# Patient Record
Sex: FEMALE | Race: WHITE
[De-identification: names, ages, dates, MRNs, and addresses within clinical notes are randomized per-mention and may not be internally consistent; named-entity substitution may affect disease eponyms.]

---

## 2018-09-15 ENCOUNTER — HOSPITAL ENCOUNTER (EMERGENCY)
Dept: HOSPITAL 15 - ER | Age: 48
Discharge: HOME | End: 2018-09-15
Payer: COMMERCIAL

## 2018-09-15 VITALS — SYSTOLIC BLOOD PRESSURE: 117 MMHG | DIASTOLIC BLOOD PRESSURE: 75 MMHG

## 2018-09-15 VITALS — HEIGHT: 64 IN | BODY MASS INDEX: 32.44 KG/M2 | WEIGHT: 190 LBS

## 2018-09-15 DIAGNOSIS — Z90.89: ICD-10-CM

## 2018-09-15 DIAGNOSIS — Z90.49: ICD-10-CM

## 2018-09-15 DIAGNOSIS — Z90.710: ICD-10-CM

## 2018-09-15 DIAGNOSIS — G43.909: ICD-10-CM

## 2018-09-15 DIAGNOSIS — E78.5: ICD-10-CM

## 2018-09-15 DIAGNOSIS — R07.89: Primary | ICD-10-CM

## 2018-09-15 LAB
ALBUMIN SERPL-MCNC: 3.5 G/DL (ref 3.4–5)
ALP SERPL-CCNC: 64 U/L (ref 45–117)
ALT SERPL-CCNC: 47 U/L (ref 13–56)
ANION GAP SERPL CALCULATED.3IONS-SCNC: 10 MMOL/L (ref 5–15)
BILIRUB SERPL-MCNC: 0.4 MG/DL (ref 0.2–1)
BUN SERPL-MCNC: 9 MG/DL (ref 7–18)
BUN/CREAT SERPL: 9.8
CALCIUM SERPL-MCNC: 8.2 MG/DL (ref 8.5–10.1)
CHLORIDE SERPL-SCNC: 108 MMOL/L (ref 98–107)
CO2 SERPL-SCNC: 21 MMOL/L (ref 21–32)
GLUCOSE SERPL-MCNC: 140 MG/DL (ref 74–106)
HCT VFR BLD AUTO: 42.5 % (ref 36–46)
HGB BLD-MCNC: 14.6 G/DL (ref 12.2–16.2)
MAGNESIUM SERPL-MCNC: 2.3 MG/DL (ref 1.6–2.6)
MCH RBC QN AUTO: 30.4 PG (ref 28–32)
MCV RBC AUTO: 88.7 FL (ref 80–100)
NRBC BLD QL AUTO: 0.2 %
POTASSIUM SERPL-SCNC: 3.9 MMOL/L (ref 3.5–5.1)
PROT SERPL-MCNC: 7.1 G/DL (ref 6.4–8.2)
SODIUM SERPL-SCNC: 139 MMOL/L (ref 136–145)

## 2018-09-15 PROCEDURE — 84484 ASSAY OF TROPONIN QUANT: CPT

## 2018-09-15 PROCEDURE — 70450 CT HEAD/BRAIN W/O DYE: CPT

## 2018-09-15 PROCEDURE — 94761 N-INVAS EAR/PLS OXIMETRY MLT: CPT

## 2018-09-15 PROCEDURE — 71046 X-RAY EXAM CHEST 2 VIEWS: CPT

## 2018-09-15 PROCEDURE — 93005 ELECTROCARDIOGRAM TRACING: CPT

## 2018-09-15 PROCEDURE — 80053 COMPREHEN METABOLIC PANEL: CPT

## 2018-09-15 PROCEDURE — 85025 COMPLETE CBC W/AUTO DIFF WBC: CPT

## 2018-09-15 PROCEDURE — 83735 ASSAY OF MAGNESIUM: CPT

## 2018-09-15 PROCEDURE — 36415 COLL VENOUS BLD VENIPUNCTURE: CPT

## 2020-07-12 ENCOUNTER — HOSPITAL ENCOUNTER (INPATIENT)
Dept: HOSPITAL 15 - ER | Age: 50
LOS: 8 days | Discharge: HOME | DRG: 177 | End: 2020-07-20
Attending: INTERNAL MEDICINE | Admitting: HOSPITALIST
Payer: COMMERCIAL

## 2020-07-12 VITALS — HEIGHT: 64 IN | BODY MASS INDEX: 30.71 KG/M2 | WEIGHT: 179.9 LBS

## 2020-07-12 DIAGNOSIS — N39.0: ICD-10-CM

## 2020-07-12 DIAGNOSIS — J12.89: ICD-10-CM

## 2020-07-12 DIAGNOSIS — U07.1: Primary | ICD-10-CM

## 2020-07-12 DIAGNOSIS — Z90.49: ICD-10-CM

## 2020-07-12 DIAGNOSIS — Z90.710: ICD-10-CM

## 2020-07-12 DIAGNOSIS — B95.2: ICD-10-CM

## 2020-07-12 DIAGNOSIS — M34.9: ICD-10-CM

## 2020-07-12 DIAGNOSIS — J96.00: ICD-10-CM

## 2020-07-12 DIAGNOSIS — E66.01: ICD-10-CM

## 2020-07-12 DIAGNOSIS — J01.00: ICD-10-CM

## 2020-07-12 DIAGNOSIS — I50.9: ICD-10-CM

## 2020-07-12 DIAGNOSIS — J98.11: ICD-10-CM

## 2020-07-12 DIAGNOSIS — E87.6: ICD-10-CM

## 2020-07-12 DIAGNOSIS — E11.65: ICD-10-CM

## 2020-07-12 PROCEDURE — 85610 PROTHROMBIN TIME: CPT

## 2020-07-12 PROCEDURE — 87186 SC STD MICRODIL/AGAR DIL: CPT

## 2020-07-12 PROCEDURE — 83036 HEMOGLOBIN GLYCOSYLATED A1C: CPT

## 2020-07-12 PROCEDURE — 36415 COLL VENOUS BLD VENIPUNCTURE: CPT

## 2020-07-12 PROCEDURE — 71045 X-RAY EXAM CHEST 1 VIEW: CPT

## 2020-07-12 PROCEDURE — 86900 BLOOD TYPING SEROLOGIC ABO: CPT

## 2020-07-12 PROCEDURE — 83880 ASSAY OF NATRIURETIC PEPTIDE: CPT

## 2020-07-12 PROCEDURE — 82728 ASSAY OF FERRITIN: CPT

## 2020-07-12 PROCEDURE — 80061 LIPID PANEL: CPT

## 2020-07-12 PROCEDURE — 86141 C-REACTIVE PROTEIN HS: CPT

## 2020-07-12 PROCEDURE — 85730 THROMBOPLASTIN TIME PARTIAL: CPT

## 2020-07-12 PROCEDURE — 82962 GLUCOSE BLOOD TEST: CPT

## 2020-07-12 PROCEDURE — 86850 RBC ANTIBODY SCREEN: CPT

## 2020-07-12 PROCEDURE — 85025 COMPLETE CBC W/AUTO DIFF WBC: CPT

## 2020-07-12 PROCEDURE — 87086 URINE CULTURE/COLONY COUNT: CPT

## 2020-07-12 PROCEDURE — 80053 COMPREHEN METABOLIC PANEL: CPT

## 2020-07-12 PROCEDURE — 87070 CULTURE OTHR SPECIMN AEROBIC: CPT

## 2020-07-12 PROCEDURE — 86901 BLOOD TYPING SEROLOGIC RH(D): CPT

## 2020-07-12 PROCEDURE — 81001 URINALYSIS AUTO W/SCOPE: CPT

## 2020-07-12 PROCEDURE — 84702 CHORIONIC GONADOTROPIN TEST: CPT

## 2020-07-12 PROCEDURE — 87880 STREP A ASSAY W/OPTIC: CPT

## 2020-07-12 PROCEDURE — 87804 INFLUENZA ASSAY W/OPTIC: CPT

## 2020-07-12 PROCEDURE — 84443 ASSAY THYROID STIM HORMONE: CPT

## 2020-07-12 PROCEDURE — 80048 BASIC METABOLIC PNL TOTAL CA: CPT

## 2020-07-12 PROCEDURE — 83735 ASSAY OF MAGNESIUM: CPT

## 2020-07-12 PROCEDURE — 83615 LACTATE (LD) (LDH) ENZYME: CPT

## 2020-07-12 PROCEDURE — 87088 URINE BACTERIA CULTURE: CPT

## 2020-07-12 PROCEDURE — 84484 ASSAY OF TROPONIN QUANT: CPT

## 2020-07-12 PROCEDURE — 93005 ELECTROCARDIOGRAM TRACING: CPT

## 2020-07-12 PROCEDURE — 84132 ASSAY OF SERUM POTASSIUM: CPT

## 2020-07-12 PROCEDURE — 87040 BLOOD CULTURE FOR BACTERIA: CPT

## 2020-07-12 PROCEDURE — 85379 FIBRIN DEGRADATION QUANT: CPT

## 2020-07-12 PROCEDURE — 83605 ASSAY OF LACTIC ACID: CPT

## 2020-07-13 LAB
ALBUMIN SERPL-MCNC: 3.8 G/DL (ref 3.4–5)
ALP SERPL-CCNC: 75 U/L (ref 45–117)
ALT SERPL-CCNC: 97 U/L (ref 13–56)
ANION GAP SERPL CALCULATED.3IONS-SCNC: 6 MMOL/L (ref 5–15)
ANION GAP SERPL CALCULATED.3IONS-SCNC: 7 MMOL/L (ref 5–15)
APTT PPP: 26.1 SEC (ref 23.64–32.05)
BILIRUB SERPL-MCNC: 0.9 MG/DL (ref 0.2–1)
BUN SERPL-MCNC: 11 MG/DL (ref 7–18)
BUN SERPL-MCNC: 12 MG/DL (ref 7–18)
BUN/CREAT SERPL: 11.1
BUN/CREAT SERPL: 15
CALCIUM SERPL-MCNC: 8.7 MG/DL (ref 8.5–10.1)
CALCIUM SERPL-MCNC: 8.9 MG/DL (ref 8.5–10.1)
CHLORIDE SERPL-SCNC: 102 MMOL/L (ref 98–107)
CHLORIDE SERPL-SCNC: 102 MMOL/L (ref 98–107)
CO2 SERPL-SCNC: 25 MMOL/L (ref 21–32)
CO2 SERPL-SCNC: 25 MMOL/L (ref 21–32)
GLUCOSE SERPL-MCNC: 164 MG/DL (ref 74–106)
GLUCOSE SERPL-MCNC: 233 MG/DL (ref 74–106)
HCT VFR BLD AUTO: 44.6 % (ref 36–46)
HCT VFR BLD AUTO: 47 % (ref 36–46)
HGB BLD-MCNC: 15.2 G/DL (ref 12.2–16.2)
HGB BLD-MCNC: 16 G/DL (ref 12.2–16.2)
INR PPP: 1 (ref 0.9–1.15)
MAGNESIUM SERPL-MCNC: 2.4 MG/DL (ref 1.6–2.6)
MCH RBC QN AUTO: 29.4 PG (ref 28–32)
MCH RBC QN AUTO: 29.8 PG (ref 28–32)
MCV RBC AUTO: 86.4 FL (ref 80–100)
MCV RBC AUTO: 87.2 FL (ref 80–100)
NRBC BLD QL AUTO: 0.2 %
NRBC BLD QL AUTO: 0.2 %
POTASSIUM SERPL-SCNC: 3 MMOL/L (ref 3.5–5.1)
POTASSIUM SERPL-SCNC: 3.3 MMOL/L (ref 3.5–5.1)
PROT SERPL-MCNC: 8.5 G/DL (ref 6.4–8.2)
SODIUM SERPL-SCNC: 133 MMOL/L (ref 136–145)
SODIUM SERPL-SCNC: 134 MMOL/L (ref 136–145)

## 2020-07-13 RX ADMIN — HYDROCODONE BITARTRATE AND ACETAMINOPHEN PRN TAB: 5; 325 TABLET ORAL at 18:50

## 2020-07-13 RX ADMIN — Medication SCH STRIP: at 18:43

## 2020-07-13 RX ADMIN — HUMAN INSULIN SCH UNITS: 100 INJECTION, SOLUTION SUBCUTANEOUS at 12:00

## 2020-07-13 RX ADMIN — Medication SCH STRIP: at 12:00

## 2020-07-13 RX ADMIN — HYDROCODONE BITARTRATE AND ACETAMINOPHEN PRN TAB: 5; 325 TABLET ORAL at 12:53

## 2020-07-13 RX ADMIN — HUMAN INSULIN SCH UNITS: 100 INJECTION, SOLUTION SUBCUTANEOUS at 18:50

## 2020-07-13 RX ADMIN — HUMAN INSULIN SCH UNITS: 100 INJECTION, SOLUTION SUBCUTANEOUS at 23:56

## 2020-07-13 RX ADMIN — ONDANSETRON HYDROCHLORIDE PRN MG: 2 INJECTION, SOLUTION INTRAMUSCULAR; INTRAVENOUS at 12:53

## 2020-07-13 RX ADMIN — Medication SCH STRIP: at 23:56

## 2020-07-13 RX ADMIN — ACETAMINOPHEN PRN MG: 325 TABLET ORAL at 09:43

## 2020-07-13 RX ADMIN — DOXYCYCLINE SCH MG: 100 TABLET ORAL at 23:55

## 2020-07-13 NOTE — NUR
1605 7/13/20 - Contacted [facility] at [phone] requesting authorization for continued 
inpatient stay. Spoke with analyst Genny who stated patient is authorized continued 
inpatient stay.

## 2020-07-14 LAB
CHOLEST SERPL-MCNC: 146 MG/DL (ref ?–200)
HDLC SERPL-MCNC: 20 MG/DL (ref 40–59)
MAGNESIUM SERPL-MCNC: 2.5 MG/DL (ref 1.6–2.6)
POTASSIUM SERPL-SCNC: 4 MMOL/L (ref 3.5–5.1)
TRIGL SERPL-MCNC: 156 MG/DL (ref ?–150)

## 2020-07-14 RX ADMIN — HUMAN INSULIN SCH UNITS: 100 INJECTION, SOLUTION SUBCUTANEOUS at 17:23

## 2020-07-14 RX ADMIN — Medication SCH STRIP: at 05:57

## 2020-07-14 RX ADMIN — ONDANSETRON HYDROCHLORIDE PRN MG: 2 INJECTION, SOLUTION INTRAMUSCULAR; INTRAVENOUS at 12:40

## 2020-07-14 RX ADMIN — HYDROCODONE BITARTRATE AND ACETAMINOPHEN PRN TAB: 5; 325 TABLET ORAL at 12:40

## 2020-07-14 RX ADMIN — DOXYCYCLINE SCH MG: 100 TABLET ORAL at 22:31

## 2020-07-14 RX ADMIN — Medication SCH STRIP: at 18:04

## 2020-07-14 RX ADMIN — ONDANSETRON HYDROCHLORIDE PRN MG: 2 INJECTION, SOLUTION INTRAMUSCULAR; INTRAVENOUS at 22:33

## 2020-07-14 RX ADMIN — ACETAMINOPHEN PRN MG: 325 TABLET ORAL at 11:17

## 2020-07-14 RX ADMIN — Medication SCH STRIP: at 12:22

## 2020-07-14 RX ADMIN — HUMAN INSULIN SCH UNITS: 100 INJECTION, SOLUTION SUBCUTANEOUS at 12:40

## 2020-07-14 RX ADMIN — POTASSIUM CHLORIDE SCH MEQ: 750 TABLET, FILM COATED, EXTENDED RELEASE ORAL at 11:15

## 2020-07-14 RX ADMIN — FUROSEMIDE SCH MG: 10 INJECTION, SOLUTION INTRAMUSCULAR; INTRAVENOUS at 11:15

## 2020-07-14 RX ADMIN — DOXYCYCLINE SCH MG: 100 TABLET ORAL at 11:16

## 2020-07-14 RX ADMIN — HUMAN INSULIN SCH UNITS: 100 INJECTION, SOLUTION SUBCUTANEOUS at 05:56

## 2020-07-14 RX ADMIN — ENOXAPARIN SODIUM SCH MG: 100 INJECTION SUBCUTANEOUS at 22:32

## 2020-07-14 NOTE — NUR
1600 7/14/20 - Contacted [facility] at [phone], requesting authorization for continued 
inpatient stay. Spoke with analyst Laura who stated there were no COVID beds available and 
provided authorization [id number] for continued inpatient stay.

## 2020-07-15 VITALS — DIASTOLIC BLOOD PRESSURE: 86 MMHG | SYSTOLIC BLOOD PRESSURE: 153 MMHG

## 2020-07-15 VITALS — SYSTOLIC BLOOD PRESSURE: 109 MMHG | DIASTOLIC BLOOD PRESSURE: 75 MMHG

## 2020-07-15 LAB
ALBUMIN SERPL-MCNC: 3.2 G/DL (ref 3.4–5)
ALP SERPL-CCNC: 62 U/L (ref 45–117)
ALT SERPL-CCNC: 63 U/L (ref 13–56)
ANION GAP SERPL CALCULATED.3IONS-SCNC: 7 MMOL/L (ref 5–15)
BILIRUB SERPL-MCNC: 0.5 MG/DL (ref 0.2–1)
BUN SERPL-MCNC: 16 MG/DL (ref 7–18)
BUN/CREAT SERPL: 20.3
CALCIUM SERPL-MCNC: 8.9 MG/DL (ref 8.5–10.1)
CHLORIDE SERPL-SCNC: 103 MMOL/L (ref 98–107)
CO2 SERPL-SCNC: 27 MMOL/L (ref 21–32)
GLUCOSE SERPL-MCNC: 236 MG/DL (ref 74–106)
HCT VFR BLD AUTO: 43.4 % (ref 36–46)
HGB BLD-MCNC: 14.5 G/DL (ref 12.2–16.2)
MCH RBC QN AUTO: 29.2 PG (ref 28–32)
MCV RBC AUTO: 87.2 FL (ref 80–100)
NRBC BLD QL AUTO: 0.1 %
POTASSIUM SERPL-SCNC: 3.9 MMOL/L (ref 3.5–5.1)
PROT SERPL-MCNC: 7.4 G/DL (ref 6.4–8.2)
SODIUM SERPL-SCNC: 137 MMOL/L (ref 136–145)

## 2020-07-15 RX ADMIN — PANTOPRAZOLE SODIUM SCH MG: 40 TABLET, DELAYED RELEASE ORAL at 10:17

## 2020-07-15 RX ADMIN — HYDROCODONE BITARTRATE AND ACETAMINOPHEN PRN TAB: 5; 325 TABLET ORAL at 19:49

## 2020-07-15 RX ADMIN — Medication SCH STRIP: at 12:55

## 2020-07-15 RX ADMIN — HUMAN INSULIN SCH UNITS: 100 INJECTION, SOLUTION SUBCUTANEOUS at 00:11

## 2020-07-15 RX ADMIN — ZINC SULFATE CAP 220 MG (50 MG ELEMENTAL ZN) SCH MG: 220 (50 ZN) CAP at 10:16

## 2020-07-15 RX ADMIN — VITAMIN D, TAB 1000IU (100/BT) SCH UNIT: 25 TAB at 10:15

## 2020-07-15 RX ADMIN — Medication SCH STRIP: at 18:20

## 2020-07-15 RX ADMIN — DOXYCYCLINE SCH MG: 100 TABLET ORAL at 21:46

## 2020-07-15 RX ADMIN — DOXYCYCLINE SCH MG: 100 TABLET ORAL at 10:17

## 2020-07-15 RX ADMIN — HUMAN INSULIN SCH UNITS: 100 INJECTION, SOLUTION SUBCUTANEOUS at 23:32

## 2020-07-15 RX ADMIN — ONDANSETRON HYDROCHLORIDE PRN MG: 2 INJECTION, SOLUTION INTRAMUSCULAR; INTRAVENOUS at 19:49

## 2020-07-15 RX ADMIN — OXYCODONE HYDROCHLORIDE AND ACETAMINOPHEN SCH MG: 500 TABLET ORAL at 10:16

## 2020-07-15 RX ADMIN — INSULIN GLARGINE SCH UNITS: 100 INJECTION, SOLUTION SUBCUTANEOUS at 21:47

## 2020-07-15 RX ADMIN — ENOXAPARIN SODIUM SCH MG: 100 INJECTION SUBCUTANEOUS at 10:19

## 2020-07-15 RX ADMIN — Medication SCH STRIP: at 23:32

## 2020-07-15 RX ADMIN — HUMAN INSULIN SCH UNITS: 100 INJECTION, SOLUTION SUBCUTANEOUS at 13:16

## 2020-07-15 RX ADMIN — Medication SCH STRIP: at 06:25

## 2020-07-15 RX ADMIN — HUMAN INSULIN SCH UNITS: 100 INJECTION, SOLUTION SUBCUTANEOUS at 19:06

## 2020-07-15 RX ADMIN — Medication SCH STRIP: at 00:12

## 2020-07-15 RX ADMIN — POTASSIUM CHLORIDE SCH MEQ: 750 TABLET, FILM COATED, EXTENDED RELEASE ORAL at 10:17

## 2020-07-15 RX ADMIN — ENOXAPARIN SODIUM SCH MG: 40 INJECTION SUBCUTANEOUS at 21:47

## 2020-07-15 RX ADMIN — FUROSEMIDE SCH MG: 10 INJECTION, SOLUTION INTRAMUSCULAR; INTRAVENOUS at 10:18

## 2020-07-15 RX ADMIN — HYDROCODONE BITARTRATE AND ACETAMINOPHEN PRN TAB: 5; 325 TABLET ORAL at 00:12

## 2020-07-15 RX ADMIN — HUMAN INSULIN SCH UNITS: 100 INJECTION, SOLUTION SUBCUTANEOUS at 06:25

## 2020-07-15 NOTE — NUR
Telemetry admit from ER

JOSE DANIEL MARROQUIN admitted to Telemetry unit after SBAR received.  Patient oriented to FLEX WEIRN primary RN, unit,245 room,b bed, and unit policies regarding patient care and 
visiting hours. Patient now on continuous telemetry monitoring, tele box #7  and telemetry 
reading on arrival to unit is sr SR 80. Patient placed on bedside oxygen, weighed by 
bedscale and encouraged to call if they need something. All questions and concerns 
addressed, patient verbalized understanding. 

Note:

## 2020-07-15 NOTE — NUR
1356 7/15/20 - [facility] at [phone], requesting authorization for continued 
inpatient stay. Spoke with analyst Amalia who provided authorization [id number] for 
continued inpatient stay.

## 2020-07-16 VITALS — DIASTOLIC BLOOD PRESSURE: 77 MMHG | SYSTOLIC BLOOD PRESSURE: 114 MMHG

## 2020-07-16 VITALS — DIASTOLIC BLOOD PRESSURE: 83 MMHG | SYSTOLIC BLOOD PRESSURE: 120 MMHG

## 2020-07-16 VITALS — DIASTOLIC BLOOD PRESSURE: 61 MMHG | SYSTOLIC BLOOD PRESSURE: 92 MMHG

## 2020-07-16 VITALS — DIASTOLIC BLOOD PRESSURE: 74 MMHG | SYSTOLIC BLOOD PRESSURE: 111 MMHG

## 2020-07-16 VITALS — DIASTOLIC BLOOD PRESSURE: 69 MMHG | SYSTOLIC BLOOD PRESSURE: 107 MMHG

## 2020-07-16 RX ADMIN — HUMAN INSULIN SCH UNITS: 100 INJECTION, SOLUTION SUBCUTANEOUS at 17:54

## 2020-07-16 RX ADMIN — HYDROCODONE BITARTRATE AND ACETAMINOPHEN PRN TAB: 5; 325 TABLET ORAL at 20:03

## 2020-07-16 RX ADMIN — DOXYCYCLINE SCH MG: 100 TABLET ORAL at 10:00

## 2020-07-16 RX ADMIN — DOXYCYCLINE SCH MG: 100 TABLET ORAL at 22:04

## 2020-07-16 RX ADMIN — Medication SCH STRIP: at 23:46

## 2020-07-16 RX ADMIN — FUROSEMIDE SCH MG: 10 INJECTION, SOLUTION INTRAMUSCULAR; INTRAVENOUS at 10:02

## 2020-07-16 RX ADMIN — Medication SCH STRIP: at 06:15

## 2020-07-16 RX ADMIN — VITAMIN D, TAB 1000IU (100/BT) SCH UNIT: 25 TAB at 10:01

## 2020-07-16 RX ADMIN — HUMAN INSULIN SCH UNITS: 100 INJECTION, SOLUTION SUBCUTANEOUS at 06:15

## 2020-07-16 RX ADMIN — POTASSIUM CHLORIDE SCH MEQ: 750 TABLET, FILM COATED, EXTENDED RELEASE ORAL at 10:00

## 2020-07-16 RX ADMIN — ENOXAPARIN SODIUM SCH MG: 40 INJECTION SUBCUTANEOUS at 10:00

## 2020-07-16 RX ADMIN — HUMAN INSULIN SCH UNITS: 100 INJECTION, SOLUTION SUBCUTANEOUS at 23:47

## 2020-07-16 RX ADMIN — ENOXAPARIN SODIUM SCH MG: 40 INJECTION SUBCUTANEOUS at 22:04

## 2020-07-16 RX ADMIN — Medication SCH STRIP: at 12:04

## 2020-07-16 RX ADMIN — PANTOPRAZOLE SODIUM SCH MG: 40 TABLET, DELAYED RELEASE ORAL at 10:00

## 2020-07-16 RX ADMIN — ZINC SULFATE CAP 220 MG (50 MG ELEMENTAL ZN) SCH MG: 220 (50 ZN) CAP at 10:00

## 2020-07-16 RX ADMIN — Medication SCH STRIP: at 17:32

## 2020-07-16 RX ADMIN — ONDANSETRON HYDROCHLORIDE PRN MG: 2 INJECTION, SOLUTION INTRAMUSCULAR; INTRAVENOUS at 20:08

## 2020-07-16 RX ADMIN — OXYCODONE HYDROCHLORIDE AND ACETAMINOPHEN SCH MG: 500 TABLET ORAL at 10:01

## 2020-07-16 RX ADMIN — INSULIN GLARGINE SCH UNITS: 100 INJECTION, SOLUTION SUBCUTANEOUS at 22:05

## 2020-07-16 RX ADMIN — HUMAN INSULIN SCH UNITS: 100 INJECTION, SOLUTION SUBCUTANEOUS at 12:43

## 2020-07-16 NOTE — NUR
Nutrition Assessment Note 



please see attached link for complete assessment



Est energy needs BW 70 [id number] kcal (23-25 kcal/kg), Est protein needs 70-77 g 
(1.0-1.1g/kg BW) will reassess prn.  




-------------------------------------------------------------------------------

Addendum: 20 at 1415 by Brittaney Silva RD

-------------------------------------------------------------------------------

Amended: Links added.

## 2020-07-16 NOTE — NUR
opening note



pt is A&ox4.  respirations even and nonlabored on 4Lnc.  pt is ambulatory, however weak.  pt 
encouraged to call for assistance when attempting to use bedside commode.  pt has a non 
productive cough, and is SOB on exertion.  POC discussed with pt.  bed in low locked 
position, call light within reach.

## 2020-07-17 VITALS — SYSTOLIC BLOOD PRESSURE: 101 MMHG | DIASTOLIC BLOOD PRESSURE: 59 MMHG

## 2020-07-17 VITALS — DIASTOLIC BLOOD PRESSURE: 66 MMHG | SYSTOLIC BLOOD PRESSURE: 99 MMHG

## 2020-07-17 VITALS — SYSTOLIC BLOOD PRESSURE: 109 MMHG | DIASTOLIC BLOOD PRESSURE: 71 MMHG

## 2020-07-17 VITALS — SYSTOLIC BLOOD PRESSURE: 102 MMHG | DIASTOLIC BLOOD PRESSURE: 74 MMHG

## 2020-07-17 VITALS — DIASTOLIC BLOOD PRESSURE: 80 MMHG | SYSTOLIC BLOOD PRESSURE: 115 MMHG

## 2020-07-17 LAB
ALBUMIN SERPL-MCNC: 3.2 G/DL (ref 3.4–5)
ALP SERPL-CCNC: 60 U/L (ref 45–117)
ALT SERPL-CCNC: 69 U/L (ref 13–56)
ANION GAP SERPL CALCULATED.3IONS-SCNC: 4 MMOL/L (ref 5–15)
BILIRUB SERPL-MCNC: 0.5 MG/DL (ref 0.2–1)
BUN SERPL-MCNC: 19 MG/DL (ref 7–18)
BUN/CREAT SERPL: 21.6
CALCIUM SERPL-MCNC: 9.1 MG/DL (ref 8.5–10.1)
CHLORIDE SERPL-SCNC: 101 MMOL/L (ref 98–107)
CO2 SERPL-SCNC: 31 MMOL/L (ref 21–32)
GLUCOSE SERPL-MCNC: 228 MG/DL (ref 74–106)
HCT VFR BLD AUTO: 44.1 % (ref 36–46)
HGB BLD-MCNC: 15.1 G/DL (ref 12.2–16.2)
MCH RBC QN AUTO: 29.6 PG (ref 28–32)
MCV RBC AUTO: 86.7 FL (ref 80–100)
NRBC BLD QL AUTO: 0.2 %
POTASSIUM SERPL-SCNC: 4.2 MMOL/L (ref 3.5–5.1)
PROT SERPL-MCNC: 7.2 G/DL (ref 6.4–8.2)
SODIUM SERPL-SCNC: 136 MMOL/L (ref 136–145)

## 2020-07-17 RX ADMIN — INSULIN GLARGINE SCH UNITS: 100 INJECTION, SOLUTION SUBCUTANEOUS at 21:51

## 2020-07-17 RX ADMIN — Medication SCH STRIP: at 05:43

## 2020-07-17 RX ADMIN — HUMAN INSULIN SCH UNITS: 100 INJECTION, SOLUTION SUBCUTANEOUS at 17:30

## 2020-07-17 RX ADMIN — PANTOPRAZOLE SODIUM SCH MG: 40 TABLET, DELAYED RELEASE ORAL at 10:38

## 2020-07-17 RX ADMIN — AMOXICILLIN AND CLAVULANATE POTASSIUM SCH MG: 500; 125 TABLET, FILM COATED ORAL at 22:31

## 2020-07-17 RX ADMIN — POTASSIUM CHLORIDE SCH MEQ: 750 TABLET, FILM COATED, EXTENDED RELEASE ORAL at 10:39

## 2020-07-17 RX ADMIN — VITAMIN D, TAB 1000IU (100/BT) SCH UNIT: 25 TAB at 10:37

## 2020-07-17 RX ADMIN — ENOXAPARIN SODIUM SCH MG: 40 INJECTION SUBCUTANEOUS at 10:38

## 2020-07-17 RX ADMIN — Medication SCH STRIP: at 12:06

## 2020-07-17 RX ADMIN — ENOXAPARIN SODIUM SCH MG: 40 INJECTION SUBCUTANEOUS at 21:49

## 2020-07-17 RX ADMIN — Medication SCH STRIP: at 23:30

## 2020-07-17 RX ADMIN — DOXYCYCLINE SCH MG: 100 TABLET ORAL at 10:38

## 2020-07-17 RX ADMIN — HUMAN INSULIN SCH UNITS: 100 INJECTION, SOLUTION SUBCUTANEOUS at 05:47

## 2020-07-17 RX ADMIN — HYDROCODONE BITARTRATE AND ACETAMINOPHEN PRN TAB: 5; 325 TABLET ORAL at 21:50

## 2020-07-17 RX ADMIN — DOXYCYCLINE SCH MG: 100 TABLET ORAL at 21:49

## 2020-07-17 RX ADMIN — HUMAN INSULIN SCH UNITS: 100 INJECTION, SOLUTION SUBCUTANEOUS at 12:19

## 2020-07-17 RX ADMIN — HUMAN INSULIN SCH UNITS: 100 INJECTION, SOLUTION SUBCUTANEOUS at 23:41

## 2020-07-17 RX ADMIN — ONDANSETRON HYDROCHLORIDE PRN MG: 2 INJECTION, SOLUTION INTRAMUSCULAR; INTRAVENOUS at 21:50

## 2020-07-17 RX ADMIN — Medication SCH STRIP: at 17:29

## 2020-07-17 RX ADMIN — FUROSEMIDE SCH MG: 10 INJECTION, SOLUTION INTRAMUSCULAR; INTRAVENOUS at 10:37

## 2020-07-17 RX ADMIN — OXYCODONE HYDROCHLORIDE AND ACETAMINOPHEN SCH MG: 500 TABLET ORAL at 10:38

## 2020-07-17 RX ADMIN — ZINC SULFATE CAP 220 MG (50 MG ELEMENTAL ZN) SCH MG: 220 (50 ZN) CAP at 10:39

## 2020-07-17 NOTE — NUR
RT NOTE:



WENT TO PTS ROOM TO ASSESS SPO2, PT IS CURRENTLY ON 4L NC, HR 59, SPO2 93% RR 18, NO 
DISTRESS NOTED AT THIS TIME. WILL CONTINUE TO MONITOR PT.

## 2020-07-17 NOTE — NUR
Closing note



pt A&Ox4.  respirations even and nonlabored on 4Lnc.  no s/s of pain or respiratory distress 
at this time.  pt is comfortably resting in left lateral position with eyes closed.  
Endorsed care to day shift RN.  bed in low locked position, call light within reach.

## 2020-07-17 NOTE — NUR
1600 7/16/20 - Contacted [facility] at [phone] requesting update on transfer to [facility]. 
Spoke with analyst Yair who stated currently there are no beds available. I requested 
authorization for continued inpatient stay. PerYair authorization [id number] has been 
extended for continued inpatient stay.

## 2020-07-17 NOTE — NUR
Opening note



pt is A&Ox4.  Respirations even and nonlabored on 2L nc.  pt is experiencing generalized 
weakness, however explains that she feels much better now than she did earlier in the day.  
no s/s of distress at this time.  bed in low locked position, call light within reach.

## 2020-07-18 VITALS — SYSTOLIC BLOOD PRESSURE: 112 MMHG | DIASTOLIC BLOOD PRESSURE: 75 MMHG

## 2020-07-18 VITALS — SYSTOLIC BLOOD PRESSURE: 106 MMHG | DIASTOLIC BLOOD PRESSURE: 71 MMHG

## 2020-07-18 VITALS — SYSTOLIC BLOOD PRESSURE: 114 MMHG | DIASTOLIC BLOOD PRESSURE: 69 MMHG

## 2020-07-18 VITALS — DIASTOLIC BLOOD PRESSURE: 70 MMHG | SYSTOLIC BLOOD PRESSURE: 100 MMHG

## 2020-07-18 RX ADMIN — Medication SCH STRIP: at 12:12

## 2020-07-18 RX ADMIN — FUROSEMIDE SCH MG: 10 INJECTION, SOLUTION INTRAMUSCULAR; INTRAVENOUS at 09:48

## 2020-07-18 RX ADMIN — DOXYCYCLINE SCH MG: 100 TABLET ORAL at 22:38

## 2020-07-18 RX ADMIN — HUMAN INSULIN SCH UNITS: 100 INJECTION, SOLUTION SUBCUTANEOUS at 05:59

## 2020-07-18 RX ADMIN — AMOXICILLIN AND CLAVULANATE POTASSIUM SCH MG: 500; 125 TABLET, FILM COATED ORAL at 22:00

## 2020-07-18 RX ADMIN — VITAMIN D, TAB 1000IU (100/BT) SCH UNIT: 25 TAB at 09:50

## 2020-07-18 RX ADMIN — Medication SCH STRIP: at 17:18

## 2020-07-18 RX ADMIN — HUMAN INSULIN SCH UNITS: 100 INJECTION, SOLUTION SUBCUTANEOUS at 12:13

## 2020-07-18 RX ADMIN — ENOXAPARIN SODIUM SCH MG: 40 INJECTION SUBCUTANEOUS at 09:50

## 2020-07-18 RX ADMIN — POTASSIUM CHLORIDE SCH MEQ: 750 TABLET, FILM COATED, EXTENDED RELEASE ORAL at 09:50

## 2020-07-18 RX ADMIN — ENOXAPARIN SODIUM SCH MG: 40 INJECTION SUBCUTANEOUS at 22:38

## 2020-07-18 RX ADMIN — HUMAN INSULIN SCH UNITS: 100 INJECTION, SOLUTION SUBCUTANEOUS at 17:19

## 2020-07-18 RX ADMIN — ACETAMINOPHEN PRN MG: 325 TABLET ORAL at 22:44

## 2020-07-18 RX ADMIN — PANTOPRAZOLE SODIUM SCH MG: 40 TABLET, DELAYED RELEASE ORAL at 09:50

## 2020-07-18 RX ADMIN — ZINC SULFATE CAP 220 MG (50 MG ELEMENTAL ZN) SCH MG: 220 (50 ZN) CAP at 09:50

## 2020-07-18 RX ADMIN — OXYCODONE HYDROCHLORIDE AND ACETAMINOPHEN SCH MG: 500 TABLET ORAL at 09:49

## 2020-07-18 RX ADMIN — AMOXICILLIN AND CLAVULANATE POTASSIUM SCH MG: 500; 125 TABLET, FILM COATED ORAL at 05:50

## 2020-07-18 RX ADMIN — Medication SCH STRIP: at 05:50

## 2020-07-18 RX ADMIN — INSULIN GLARGINE SCH UNITS: 100 INJECTION, SOLUTION SUBCUTANEOUS at 22:49

## 2020-07-18 RX ADMIN — ONDANSETRON HYDROCHLORIDE PRN MG: 2 INJECTION, SOLUTION INTRAMUSCULAR; INTRAVENOUS at 22:39

## 2020-07-18 RX ADMIN — DOXYCYCLINE SCH MG: 100 TABLET ORAL at 09:50

## 2020-07-18 RX ADMIN — AMOXICILLIN AND CLAVULANATE POTASSIUM SCH MG: 500; 125 TABLET, FILM COATED ORAL at 14:03

## 2020-07-18 NOTE — NUR
Closing note



pt resting in prone position with eyes closed.  Pt is on 3Lnc.  respirations even and 
nonlabored.  No s/s of pain or distress at this time.  Bed in low locked position, call 
light within reach.  Endorsed care to day shift RN. yes

## 2020-07-18 NOTE — NUR
Room change

Patient transferred to room 251-A via wheel chair with all personal belongings. Patient 
tolerated well.

## 2020-07-18 NOTE — NUR
OPENING SHIFT NOTE



Assumed care of patient. PT is awake and alert x4. Patient is on o2 at 3L via NC. No 
sign/symptoms of distress noted. Instructed on plan of care and encouraged to call for 
assistance as needed, patient verbalized understanding. Bed is locked in lowest position, 
side rails x 2 are up, call light is within reach, and bed alarm is on. Will continue to 
monitor. Statement Selected

## 2020-07-18 NOTE — NUR
Respiratory note:

PT SEEN AND ASSESSED AT THIS TIME. NO DISTRESS NOTED. HR 70 RR 18 SP02 96% ON 3L NASAL 
CANNULA.

## 2020-07-18 NOTE — NUR
OPENING SHIFT NOTE

Assumed care of patient who is a&o X4. Currently on 3L NC with no s/s of distress. Denies 
pain, but states that she feels "very weak and dizzy". PIV in left forearm is intact and 
patent. Flushed with 10ml NS. Patient is ambulatory at baseline, currently is able to 
transfer to the [facility] independently. POC discussed and all questions answered. Bed is in low 
locked position with side rails up x2. Call light is within reach and patient encouraged to 
call for assistance when needed. Will continue to monitor for changes PRN.

## 2020-07-19 VITALS — DIASTOLIC BLOOD PRESSURE: 69 MMHG | SYSTOLIC BLOOD PRESSURE: 109 MMHG

## 2020-07-19 VITALS — SYSTOLIC BLOOD PRESSURE: 116 MMHG | DIASTOLIC BLOOD PRESSURE: 84 MMHG

## 2020-07-19 VITALS — DIASTOLIC BLOOD PRESSURE: 71 MMHG | SYSTOLIC BLOOD PRESSURE: 105 MMHG

## 2020-07-19 VITALS — SYSTOLIC BLOOD PRESSURE: 107 MMHG | DIASTOLIC BLOOD PRESSURE: 70 MMHG

## 2020-07-19 VITALS — SYSTOLIC BLOOD PRESSURE: 105 MMHG | DIASTOLIC BLOOD PRESSURE: 72 MMHG

## 2020-07-19 LAB
ALBUMIN SERPL-MCNC: 3.6 G/DL (ref 3.4–5)
ALP SERPL-CCNC: 60 U/L (ref 45–117)
ALT SERPL-CCNC: 123 U/L (ref 13–56)
ANION GAP SERPL CALCULATED.3IONS-SCNC: 7 MMOL/L (ref 5–15)
BILIRUB SERPL-MCNC: 0.6 MG/DL (ref 0.2–1)
BUN SERPL-MCNC: 23 MG/DL (ref 7–18)
BUN/CREAT SERPL: 24
CALCIUM SERPL-MCNC: 9.8 MG/DL (ref 8.5–10.1)
CHLORIDE SERPL-SCNC: 96 MMOL/L (ref 98–107)
CO2 SERPL-SCNC: 31 MMOL/L (ref 21–32)
GLUCOSE SERPL-MCNC: 275 MG/DL (ref 74–106)
POTASSIUM SERPL-SCNC: 4.4 MMOL/L (ref 3.5–5.1)
PROT SERPL-MCNC: 7.5 G/DL (ref 6.4–8.2)
SODIUM SERPL-SCNC: 134 MMOL/L (ref 136–145)

## 2020-07-19 RX ADMIN — Medication SCH STRIP: at 17:57

## 2020-07-19 RX ADMIN — Medication SCH STRIP: at 12:30

## 2020-07-19 RX ADMIN — ENOXAPARIN SODIUM SCH MG: 40 INJECTION SUBCUTANEOUS at 11:59

## 2020-07-19 RX ADMIN — DOXYCYCLINE SCH MG: 100 TABLET ORAL at 22:25

## 2020-07-19 RX ADMIN — ZINC SULFATE CAP 220 MG (50 MG ELEMENTAL ZN) SCH MG: 220 (50 ZN) CAP at 11:57

## 2020-07-19 RX ADMIN — OXYCODONE HYDROCHLORIDE AND ACETAMINOPHEN SCH MG: 500 TABLET ORAL at 11:59

## 2020-07-19 RX ADMIN — PANTOPRAZOLE SODIUM SCH MG: 40 TABLET, DELAYED RELEASE ORAL at 11:57

## 2020-07-19 RX ADMIN — VITAMIN D, TAB 1000IU (100/BT) SCH UNIT: 25 TAB at 12:00

## 2020-07-19 RX ADMIN — AMOXICILLIN AND CLAVULANATE POTASSIUM SCH MG: 500; 125 TABLET, FILM COATED ORAL at 05:38

## 2020-07-19 RX ADMIN — AMOXICILLIN AND CLAVULANATE POTASSIUM SCH MG: 500; 125 TABLET, FILM COATED ORAL at 14:00

## 2020-07-19 RX ADMIN — FUROSEMIDE SCH MG: 10 INJECTION, SOLUTION INTRAMUSCULAR; INTRAVENOUS at 11:56

## 2020-07-19 RX ADMIN — POTASSIUM CHLORIDE SCH MEQ: 750 TABLET, FILM COATED, EXTENDED RELEASE ORAL at 11:57

## 2020-07-19 RX ADMIN — ONDANSETRON HYDROCHLORIDE PRN MG: 2 INJECTION, SOLUTION INTRAMUSCULAR; INTRAVENOUS at 04:36

## 2020-07-19 RX ADMIN — DOXYCYCLINE SCH MG: 100 TABLET ORAL at 11:57

## 2020-07-19 RX ADMIN — Medication SCH STRIP: at 00:00

## 2020-07-19 RX ADMIN — ENOXAPARIN SODIUM SCH MG: 40 INJECTION SUBCUTANEOUS at 22:26

## 2020-07-19 RX ADMIN — HUMAN INSULIN SCH UNITS: 100 INJECTION, SOLUTION SUBCUTANEOUS at 17:58

## 2020-07-19 RX ADMIN — HUMAN INSULIN SCH UNITS: 100 INJECTION, SOLUTION SUBCUTANEOUS at 05:34

## 2020-07-19 RX ADMIN — HYDROCODONE BITARTRATE AND ACETAMINOPHEN PRN TAB: 5; 325 TABLET ORAL at 04:35

## 2020-07-19 RX ADMIN — ACETAMINOPHEN PRN MG: 325 TABLET ORAL at 22:24

## 2020-07-19 RX ADMIN — INSULIN GLARGINE SCH UNITS: 100 INJECTION, SOLUTION SUBCUTANEOUS at 22:26

## 2020-07-19 RX ADMIN — Medication SCH STRIP: at 05:30

## 2020-07-19 RX ADMIN — AMOXICILLIN AND CLAVULANATE POTASSIUM SCH MG: 500; 125 TABLET, FILM COATED ORAL at 22:25

## 2020-07-19 RX ADMIN — HUMAN INSULIN SCH UNITS: 100 INJECTION, SOLUTION SUBCUTANEOUS at 00:29

## 2020-07-19 RX ADMIN — HUMAN INSULIN SCH UNITS: 100 INJECTION, SOLUTION SUBCUTANEOUS at 12:29

## 2020-07-19 NOTE — NUR
Nutrition Followup Note



Wt 84.1kg



Pt is covid positive in the covid wing.  pt has a diet order of CCHO 60g.  Pt appetite is 
fair aeb pt with inadequate po intake aeb pt with 50% po intake x 3 days per RN note.  Will 
continue to monitor po intake and need for additional oral supplement



Est energy needs BW 70 [id number] kcal (23-25 kcal/kg), Est protein needs 70-77 g 
(1.0-1.1g/kg BW) will reassess prn.  



Labs:  Gluc 286H, BUN 19H, Alb 3.2L



BM:  1 BM  per RN note 



Skin:  Bs 18 mod risk, full details in RN wound care note



PES:  Altered nutrition related lab values r/t current chronic medical condition aeb 
hyperglcyemia mild hypoalb

Decreased nutrient needs r/t adiposity aeb pt`s high BMI of 33.1 kgm



Comments 

1) refer to CDE on DC

2) continue current plan of care

Expected Outcomes/Goals: 

pt will have improved labs

pt will not gain any more wt

F/u mod 3-5 days

## 2020-07-20 VITALS — SYSTOLIC BLOOD PRESSURE: 105 MMHG | DIASTOLIC BLOOD PRESSURE: 74 MMHG

## 2020-07-20 VITALS — DIASTOLIC BLOOD PRESSURE: 73 MMHG | SYSTOLIC BLOOD PRESSURE: 106 MMHG

## 2020-07-20 VITALS — SYSTOLIC BLOOD PRESSURE: 101 MMHG | DIASTOLIC BLOOD PRESSURE: 70 MMHG

## 2020-07-20 VITALS — SYSTOLIC BLOOD PRESSURE: 105 MMHG | DIASTOLIC BLOOD PRESSURE: 75 MMHG

## 2020-07-20 RX ADMIN — AMOXICILLIN AND CLAVULANATE POTASSIUM SCH MG: 500; 125 TABLET, FILM COATED ORAL at 12:07

## 2020-07-20 RX ADMIN — Medication SCH STRIP: at 06:44

## 2020-07-20 RX ADMIN — ENOXAPARIN SODIUM SCH MG: 40 INJECTION SUBCUTANEOUS at 09:29

## 2020-07-20 RX ADMIN — VITAMIN D, TAB 1000IU (100/BT) SCH UNIT: 25 TAB at 09:28

## 2020-07-20 RX ADMIN — Medication SCH STRIP: at 00:22

## 2020-07-20 RX ADMIN — Medication SCH STRIP: at 18:00

## 2020-07-20 RX ADMIN — HUMAN INSULIN SCH UNITS: 100 INJECTION, SOLUTION SUBCUTANEOUS at 18:00

## 2020-07-20 RX ADMIN — ZINC SULFATE CAP 220 MG (50 MG ELEMENTAL ZN) SCH MG: 220 (50 ZN) CAP at 09:29

## 2020-07-20 RX ADMIN — POTASSIUM CHLORIDE SCH MEQ: 750 TABLET, FILM COATED, EXTENDED RELEASE ORAL at 09:29

## 2020-07-20 RX ADMIN — HUMAN INSULIN SCH UNITS: 100 INJECTION, SOLUTION SUBCUTANEOUS at 00:25

## 2020-07-20 RX ADMIN — DOXYCYCLINE SCH MG: 100 TABLET ORAL at 09:29

## 2020-07-20 RX ADMIN — OXYCODONE HYDROCHLORIDE AND ACETAMINOPHEN SCH MG: 500 TABLET ORAL at 09:28

## 2020-07-20 RX ADMIN — PANTOPRAZOLE SODIUM SCH MG: 40 TABLET, DELAYED RELEASE ORAL at 09:29

## 2020-07-20 RX ADMIN — HUMAN INSULIN SCH UNITS: 100 INJECTION, SOLUTION SUBCUTANEOUS at 12:49

## 2020-07-20 RX ADMIN — AMOXICILLIN AND CLAVULANATE POTASSIUM SCH MG: 500; 125 TABLET, FILM COATED ORAL at 06:44

## 2020-07-20 RX ADMIN — ACETAMINOPHEN PRN MG: 325 TABLET ORAL at 09:29

## 2020-07-20 RX ADMIN — FUROSEMIDE SCH MG: 10 INJECTION, SOLUTION INTRAMUSCULAR; INTRAVENOUS at 09:27

## 2020-07-20 RX ADMIN — HUMAN INSULIN SCH UNITS: 100 INJECTION, SOLUTION SUBCUTANEOUS at 06:50

## 2020-07-20 RX ADMIN — Medication SCH STRIP: at 12:50

## 2020-07-20 NOTE — NUR
DISCHARGE INSTRUCTIONS PROVIDED TO PT. PT VERBALIZED UNDERSTANDING FOR PRESCRIPTION ORDERS 
AND FOLLOW UP APPOINTMENT WITH PCP. EDUCATIONAL MATERIAL ON DISEASE MANAGEMENT PROVIDED, ALL 
QUESTION AND CONCERNS ADDRESSED. 

IV CATHETER DC'D CATHETER INTACT, NO PHLEBITIS, TELE BOX REMOVED AND RETURNED TO TELE DEPT. 

PT SAFELY ESCORTED OUT OF UNIT VIA WHEELCHAIR.

## 2020-08-09 ENCOUNTER — HOSPITAL ENCOUNTER (INPATIENT)
Dept: HOSPITAL 15 - ER | Age: 50
LOS: 4 days | Discharge: HOME | DRG: 392 | End: 2020-08-13
Attending: INTERNAL MEDICINE | Admitting: HOSPITALIST
Payer: COMMERCIAL

## 2020-08-09 VITALS — BODY MASS INDEX: 34.53 KG/M2 | HEIGHT: 65 IN | WEIGHT: 207.23 LBS

## 2020-08-09 DIAGNOSIS — K76.0: ICD-10-CM

## 2020-08-09 DIAGNOSIS — Z90.710: ICD-10-CM

## 2020-08-09 DIAGNOSIS — E11.65: ICD-10-CM

## 2020-08-09 DIAGNOSIS — K29.70: Primary | ICD-10-CM

## 2020-08-09 DIAGNOSIS — E66.01: ICD-10-CM

## 2020-08-09 DIAGNOSIS — K29.80: ICD-10-CM

## 2020-08-09 DIAGNOSIS — Z86.19: ICD-10-CM

## 2020-08-09 DIAGNOSIS — Z79.899: ICD-10-CM

## 2020-08-09 DIAGNOSIS — K57.30: ICD-10-CM

## 2020-08-09 DIAGNOSIS — N17.9: ICD-10-CM

## 2020-08-09 DIAGNOSIS — Z03.818: ICD-10-CM

## 2020-08-09 DIAGNOSIS — E87.2: ICD-10-CM

## 2020-08-09 DIAGNOSIS — Z90.49: ICD-10-CM

## 2020-08-09 LAB
ALBUMIN SERPL-MCNC: 3.9 G/DL (ref 3.4–5)
ALP SERPL-CCNC: 63 U/L (ref 45–117)
ALT SERPL-CCNC: 201 U/L (ref 13–56)
AMYLASE SERPL-CCNC: 63 U/L (ref 25–115)
ANION GAP SERPL CALCULATED.3IONS-SCNC: 9 MMOL/L (ref 5–15)
APTT PPP: 22 SEC (ref 23–31.2)
BILIRUB SERPL-MCNC: 0.7 MG/DL (ref 0.2–1)
BUN SERPL-MCNC: 11 MG/DL (ref 7–18)
BUN/CREAT SERPL: 10.7
CALCIUM SERPL-MCNC: 9.1 MG/DL (ref 8.5–10.1)
CHLORIDE SERPL-SCNC: 107 MMOL/L (ref 98–107)
CO2 SERPL-SCNC: 22 MMOL/L (ref 21–32)
GLUCOSE SERPL-MCNC: 230 MG/DL (ref 74–106)
HCT VFR BLD AUTO: 41.9 % (ref 36–46)
HGB BLD-MCNC: 14.1 G/DL (ref 12.2–16.2)
INR PPP: 0.97 (ref 0.9–1.15)
LACTATE PLASV-SCNC: 3.2 MMOL/L (ref 0.4–2)
LIPASE SERPL-CCNC: 248 U/L (ref 73–393)
MAGNESIUM SERPL-MCNC: 2.3 MG/DL (ref 1.6–2.6)
MCH RBC QN AUTO: 29.4 PG (ref 28–32)
MCV RBC AUTO: 87.7 FL (ref 80–100)
POTASSIUM SERPL-SCNC: 3.6 MMOL/L (ref 3.5–5.1)
PROT SERPL-MCNC: 6.6 G/DL (ref 6.4–8.2)
SODIUM SERPL-SCNC: 138 MMOL/L (ref 136–145)

## 2020-08-09 PROCEDURE — 96372 THER/PROPH/DIAG INJ SC/IM: CPT

## 2020-08-09 PROCEDURE — 93005 ELECTROCARDIOGRAM TRACING: CPT

## 2020-08-09 PROCEDURE — 74177 CT ABD & PELVIS W/CONTRAST: CPT

## 2020-08-09 PROCEDURE — 83690 ASSAY OF LIPASE: CPT

## 2020-08-09 PROCEDURE — 81001 URINALYSIS AUTO W/SCOPE: CPT

## 2020-08-09 PROCEDURE — 83735 ASSAY OF MAGNESIUM: CPT

## 2020-08-09 PROCEDURE — 85379 FIBRIN DEGRADATION QUANT: CPT

## 2020-08-09 PROCEDURE — 87040 BLOOD CULTURE FOR BACTERIA: CPT

## 2020-08-09 PROCEDURE — 85730 THROMBOPLASTIN TIME PARTIAL: CPT

## 2020-08-09 PROCEDURE — 83036 HEMOGLOBIN GLYCOSYLATED A1C: CPT

## 2020-08-09 PROCEDURE — 96375 TX/PRO/DX INJ NEW DRUG ADDON: CPT

## 2020-08-09 PROCEDURE — 87081 CULTURE SCREEN ONLY: CPT

## 2020-08-09 PROCEDURE — 82962 GLUCOSE BLOOD TEST: CPT

## 2020-08-09 PROCEDURE — 85007 BL SMEAR W/DIFF WBC COUNT: CPT

## 2020-08-09 PROCEDURE — 84484 ASSAY OF TROPONIN QUANT: CPT

## 2020-08-09 PROCEDURE — 80053 COMPREHEN METABOLIC PANEL: CPT

## 2020-08-09 PROCEDURE — 85025 COMPLETE CBC W/AUTO DIFF WBC: CPT

## 2020-08-09 PROCEDURE — 96376 TX/PRO/DX INJ SAME DRUG ADON: CPT

## 2020-08-09 PROCEDURE — 96361 HYDRATE IV INFUSION ADD-ON: CPT

## 2020-08-09 PROCEDURE — 85610 PROTHROMBIN TIME: CPT

## 2020-08-09 PROCEDURE — 87086 URINE CULTURE/COLONY COUNT: CPT

## 2020-08-09 PROCEDURE — 83615 LACTATE (LD) (LDH) ENZYME: CPT

## 2020-08-09 PROCEDURE — 87186 SC STD MICRODIL/AGAR DIL: CPT

## 2020-08-09 PROCEDURE — 87088 URINE BACTERIA CULTURE: CPT

## 2020-08-09 PROCEDURE — 83605 ASSAY OF LACTIC ACID: CPT

## 2020-08-09 PROCEDURE — 82150 ASSAY OF AMYLASE: CPT

## 2020-08-09 PROCEDURE — 86141 C-REACTIVE PROTEIN HS: CPT

## 2020-08-09 PROCEDURE — 85027 COMPLETE CBC AUTOMATED: CPT

## 2020-08-09 PROCEDURE — 96365 THER/PROPH/DIAG IV INF INIT: CPT

## 2020-08-09 PROCEDURE — 36415 COLL VENOUS BLD VENIPUNCTURE: CPT

## 2020-08-09 PROCEDURE — 71045 X-RAY EXAM CHEST 1 VIEW: CPT

## 2020-08-09 PROCEDURE — 82270 OCCULT BLOOD FECES: CPT

## 2020-08-09 PROCEDURE — 93970 EXTREMITY STUDY: CPT

## 2020-08-09 PROCEDURE — 96368 THER/DIAG CONCURRENT INF: CPT

## 2020-08-09 PROCEDURE — 82728 ASSAY OF FERRITIN: CPT

## 2020-08-09 PROCEDURE — 94640 AIRWAY INHALATION TREATMENT: CPT

## 2020-08-10 VITALS — DIASTOLIC BLOOD PRESSURE: 78 MMHG | SYSTOLIC BLOOD PRESSURE: 128 MMHG

## 2020-08-10 VITALS — SYSTOLIC BLOOD PRESSURE: 126 MMHG | DIASTOLIC BLOOD PRESSURE: 84 MMHG

## 2020-08-10 VITALS — DIASTOLIC BLOOD PRESSURE: 91 MMHG | SYSTOLIC BLOOD PRESSURE: 129 MMHG

## 2020-08-10 VITALS — SYSTOLIC BLOOD PRESSURE: 132 MMHG | DIASTOLIC BLOOD PRESSURE: 78 MMHG

## 2020-08-10 VITALS — DIASTOLIC BLOOD PRESSURE: 81 MMHG | SYSTOLIC BLOOD PRESSURE: 112 MMHG

## 2020-08-10 VITALS — SYSTOLIC BLOOD PRESSURE: 112 MMHG | DIASTOLIC BLOOD PRESSURE: 76 MMHG

## 2020-08-10 RX ADMIN — HUMAN INSULIN SCH UNITS: 100 INJECTION, SOLUTION SUBCUTANEOUS at 17:19

## 2020-08-10 RX ADMIN — DOXYCYCLINE SCH MG: 100 TABLET ORAL at 03:03

## 2020-08-10 RX ADMIN — SODIUM CHLORIDE SCH MLS/HR: 0.9 INJECTION, SOLUTION INTRAVENOUS at 03:03

## 2020-08-10 RX ADMIN — DOXYCYCLINE SCH MG: 100 TABLET ORAL at 08:44

## 2020-08-10 RX ADMIN — HUMAN INSULIN SCH UNITS: 100 INJECTION, SOLUTION SUBCUTANEOUS at 04:31

## 2020-08-10 RX ADMIN — HYDROCODONE BITARTRATE AND ACETAMINOPHEN PRN TAB: 5; 325 TABLET ORAL at 17:38

## 2020-08-10 RX ADMIN — Medication SCH STRIP: at 17:14

## 2020-08-10 RX ADMIN — Medication SCH STRIP: at 21:18

## 2020-08-10 RX ADMIN — SODIUM CHLORIDE SCH MLS/HR: 0.9 INJECTION, SOLUTION INTRAVENOUS at 21:42

## 2020-08-10 RX ADMIN — HUMAN INSULIN SCH UNITS: 100 INJECTION, SOLUTION SUBCUTANEOUS at 08:21

## 2020-08-10 RX ADMIN — Medication SCH STRIP: at 11:59

## 2020-08-10 RX ADMIN — ZINC SULFATE CAP 220 MG (50 MG ELEMENTAL ZN) SCH MG: 220 (50 ZN) CAP at 08:44

## 2020-08-10 RX ADMIN — HUMAN INSULIN SCH UNITS: 100 INJECTION, SOLUTION SUBCUTANEOUS at 12:21

## 2020-08-10 RX ADMIN — ALBUTEROL SULFATE SCH MCG: 108 AEROSOL, METERED RESPIRATORY (INHALATION) at 22:07

## 2020-08-10 RX ADMIN — INSULIN GLARGINE SCH UNITS: 100 INJECTION, SOLUTION SUBCUTANEOUS at 21:27

## 2020-08-10 RX ADMIN — Medication SCH STRIP: at 04:30

## 2020-08-10 RX ADMIN — ALBUTEROL SULFATE SCH MCG: 108 AEROSOL, METERED RESPIRATORY (INHALATION) at 06:00

## 2020-08-10 RX ADMIN — HUMAN INSULIN SCH UNITS: 100 INJECTION, SOLUTION SUBCUTANEOUS at 21:26

## 2020-08-10 RX ADMIN — Medication SCH STRIP: at 08:12

## 2020-08-10 RX ADMIN — ALBUTEROL SULFATE SCH MCG: 108 AEROSOL, METERED RESPIRATORY (INHALATION) at 13:20

## 2020-08-10 RX ADMIN — HYDROCODONE BITARTRATE AND ACETAMINOPHEN PRN TAB: 5; 325 TABLET ORAL at 08:45

## 2020-08-10 RX ADMIN — Medication SCH MG: at 08:44

## 2020-08-10 NOTE — NUR
OPENING SHIFT NOTE

ASSUMED CARE OF PATIENT FROM NIGHT SHIFT RN. PATIENT HAS NO S/S OF DISTRESS/SOB OR PAIN. 
INSTRUCTED PATIENT ON POC, PATIENT VERBALIZED UNDERSTANDING. PATIENT HAD A MODERATE BM WITH 
SCANT AMOUNT OF BLOOD, WILL INFORM MD. BED IS IN LOWEST POSITION WITH SIDE RAILS RAISED X2, 
BED WHEELS LOCKED, AND CALL LIGHT IS WITHIN REACH. WILL CONTINUE TO MONITOR.

## 2020-08-10 NOTE — NUR
SPOKE WITH DR. LUIS. PER MD SHE WANT PATIENT TO BE SWABBED FOR COVID. INFORMED MD LAST SWAB 
WAS DONE BY [facility] ON 8/1/2020. MD IS AWARE. WILL SWAB PATIENT FOR COVID AND WILL SEND TO 
LAB.

## 2020-08-10 NOTE — NUR
PER CHARGE RYAN HERNANDEZ PATIENT CAN BE TRANSFERRED TO ROOM 208 DURING SHIFT CHANGE AND REPORT TO 
BE GIVEN TO NIGHT SHIFT RYAN ONOFRE

## 2020-08-10 NOTE — NUR
CLOSING SHIFT NOTE

PATIENT HAS NO S/S OF DISTRESS/SOB OR PAIN AT THIS TIME. WILL ENDORSE CARE TO NIGHT SHIFT RN 
.

## 2020-08-10 NOTE — NUR
mrsa swab sent

patient refused covid swab. per patient " i had my test done on 8-1-20 and my results on the 
8-2-20 came back positive for covid, i was told by the nurse in the er that it does not need 
to be done, the nurse talked to the supervisor" will endorse care to dayshift rn for 
calcification of covid swab testing.

## 2020-08-10 NOTE — NUR
Telemetry admit from ER

JOSE DANIEL MARROQUIN admitted to Telemetry unit after SBAR received.  Patient oriented to MORGAN DE SOUZA RN primary RN, unit, room, bed, and unit policies regarding patient care and 
visiting hours. Patient now on continuous telemetry monitoring, tele box # 6 SR at 60bpm. 
Patient placed on bedside oxygen, weighed by bedscale and encouraged to call if they need 
something. All questions and concerns addressed, patient verbalized understanding.

## 2020-08-10 NOTE — NUR
RECEIVED CALL FROM MD MOHSIN INFORMED MD PATIENT IS NEGATIVE AND WILL BE TRANSFERRED TO ROOM 
208. MD WANTS ANOTHER SWAB DONE IN 24 HOURS TO CONFIRM NEGATIVE RESULT BEFORE TRANSFERRING 
PATIENT TO ROOM 208. INFORMED CHARGE RYAN HERNANDEZ.

## 2020-08-10 NOTE — NUR
RECD PT ON ROOM AIR, SPO2 96%, NO S/S OF RESPIRATORY DISTRESS. ALBUTEROL MDI NOT AVAILABLE, 
WILL NOTIFY PHARMACY. CONTINUE WITH CARE.

## 2020-08-10 NOTE — NUR
Nutrition Assessment Notes



Please refer to link for full assessment notes.



Est Energy needs: 7821-7305 kcals (17-20 kcal/kgBW)

Est Protein needs: 68-86 gms/day (0.8-1.0 gm/kgBW)



Will continue to monitor and reassess prn.

-------------------------------------------------------------------------------

Addendum: 08/10/20 at 1444 by Eusebia Maharaj RD

-------------------------------------------------------------------------------

Amended: Links added.

## 2020-08-10 NOTE — NUR
Opening Shift Note

Assumed care of patient, awake and alert.  No S/S of distress/SOB or pain.  Instructed on 
POC and to call for assist PRN. Bed in lowest locked position, call light within reach, side 
rails up x2. Will continue to monitor for changes Q1hr and PRN.

## 2020-08-11 VITALS — DIASTOLIC BLOOD PRESSURE: 64 MMHG | SYSTOLIC BLOOD PRESSURE: 110 MMHG

## 2020-08-11 VITALS — SYSTOLIC BLOOD PRESSURE: 104 MMHG | DIASTOLIC BLOOD PRESSURE: 69 MMHG

## 2020-08-11 VITALS — DIASTOLIC BLOOD PRESSURE: 81 MMHG | SYSTOLIC BLOOD PRESSURE: 120 MMHG

## 2020-08-11 VITALS — DIASTOLIC BLOOD PRESSURE: 85 MMHG | SYSTOLIC BLOOD PRESSURE: 128 MMHG

## 2020-08-11 VITALS — SYSTOLIC BLOOD PRESSURE: 109 MMHG | DIASTOLIC BLOOD PRESSURE: 69 MMHG

## 2020-08-11 VITALS — SYSTOLIC BLOOD PRESSURE: 127 MMHG | DIASTOLIC BLOOD PRESSURE: 73 MMHG

## 2020-08-11 LAB
ALBUMIN SERPL-MCNC: 3.7 G/DL (ref 3.4–5)
ALP SERPL-CCNC: 62 U/L (ref 45–117)
ALT SERPL-CCNC: 151 U/L (ref 13–56)
ANION GAP SERPL CALCULATED.3IONS-SCNC: 7 MMOL/L (ref 5–15)
BILIRUB SERPL-MCNC: 0.8 MG/DL (ref 0.2–1)
BUN SERPL-MCNC: 14 MG/DL (ref 7–18)
BUN/CREAT SERPL: 15.2
CALCIUM SERPL-MCNC: 8.8 MG/DL (ref 8.5–10.1)
CHLORIDE SERPL-SCNC: 105 MMOL/L (ref 98–107)
CO2 SERPL-SCNC: 26 MMOL/L (ref 21–32)
GLUCOSE SERPL-MCNC: 204 MG/DL (ref 74–106)
HCT VFR BLD AUTO: 42.1 % (ref 36–46)
HGB BLD-MCNC: 14.2 G/DL (ref 12.2–16.2)
MCH RBC QN AUTO: 30.2 PG (ref 28–32)
MCV RBC AUTO: 89.3 FL (ref 80–100)
NRBC BLD QL AUTO: 0.1 %
POTASSIUM SERPL-SCNC: 4.1 MMOL/L (ref 3.5–5.1)
PROT SERPL-MCNC: 6.7 G/DL (ref 6.4–8.2)
SODIUM SERPL-SCNC: 138 MMOL/L (ref 136–145)

## 2020-08-11 RX ADMIN — INSULIN GLARGINE SCH UNITS: 100 INJECTION, SOLUTION SUBCUTANEOUS at 09:32

## 2020-08-11 RX ADMIN — HUMAN INSULIN SCH UNITS: 100 INJECTION, SOLUTION SUBCUTANEOUS at 17:49

## 2020-08-11 RX ADMIN — Medication SCH STRIP: at 17:48

## 2020-08-11 RX ADMIN — Medication SCH STRIP: at 21:53

## 2020-08-11 RX ADMIN — Medication SCH STRIP: at 05:53

## 2020-08-11 RX ADMIN — SODIUM CHLORIDE SCH MLS/HR: 0.9 INJECTION, SOLUTION INTRAVENOUS at 13:48

## 2020-08-11 RX ADMIN — HUMAN INSULIN SCH UNITS: 100 INJECTION, SOLUTION SUBCUTANEOUS at 21:57

## 2020-08-11 RX ADMIN — Medication SCH MG: at 09:30

## 2020-08-11 RX ADMIN — INSULIN GLARGINE SCH UNITS: 100 INJECTION, SOLUTION SUBCUTANEOUS at 22:00

## 2020-08-11 RX ADMIN — PANTOPRAZOLE SODIUM SCH MG: 40 TABLET, DELAYED RELEASE ORAL at 14:08

## 2020-08-11 RX ADMIN — ALBUTEROL SULFATE SCH MCG: 108 AEROSOL, METERED RESPIRATORY (INHALATION) at 06:13

## 2020-08-11 RX ADMIN — SUCRALFATE SCH GM: 1 SUSPENSION ORAL at 14:09

## 2020-08-11 RX ADMIN — Medication SCH STRIP: at 14:08

## 2020-08-11 RX ADMIN — ALBUTEROL SULFATE SCH MCG: 108 AEROSOL, METERED RESPIRATORY (INHALATION) at 14:19

## 2020-08-11 RX ADMIN — SUCRALFATE SCH GM: 1 SUSPENSION ORAL at 17:49

## 2020-08-11 RX ADMIN — PANTOPRAZOLE SODIUM SCH MG: 40 TABLET, DELAYED RELEASE ORAL at 21:52

## 2020-08-11 RX ADMIN — SUCRALFATE SCH GM: 1 SUSPENSION ORAL at 21:52

## 2020-08-11 RX ADMIN — ZINC SULFATE CAP 220 MG (50 MG ELEMENTAL ZN) SCH MG: 220 (50 ZN) CAP at 09:30

## 2020-08-11 RX ADMIN — HUMAN INSULIN SCH UNITS: 100 INJECTION, SOLUTION SUBCUTANEOUS at 05:53

## 2020-08-11 RX ADMIN — HYDROCODONE BITARTRATE AND ACETAMINOPHEN PRN TAB: 5; 325 TABLET ORAL at 22:10

## 2020-08-11 RX ADMIN — HUMAN INSULIN SCH UNITS: 100 INJECTION, SOLUTION SUBCUTANEOUS at 14:17

## 2020-08-11 NOTE — NUR
TRANSFER TO  [facility]EN[facility] 

PT TAKEN TO ANTE ROOM TO [facility] VIA WHEELCHAIR ALL BELONGING TAKEN WITH PT.  PT IS A0X4 IS 
ON ROOM AIR ,NO S/S OF DISTRESS OR SOB AND NO INCIDENT DURING TIME OF TRANSFER

## 2020-08-11 NOTE — NUR
Transfer from COVID unit

Pt was transferred from COVID unit to room 205 via wheelchair. Patient is awake, alert, and 
oriented X 4.  No S/S of respiratory distress noted or reported. On RA with SpO@ 93%. 
Respirations are regular and non-labored. Patient was oriented to the room and unit. Bed in 
lowest position, brakes locked, side rails X 2 up, call light within reach. POC discussed 
and patient instructed to call for assistance as needed. Will continue to monitor for 
changes Q1hr and PRN.

## 2020-08-12 VITALS — SYSTOLIC BLOOD PRESSURE: 137 MMHG | DIASTOLIC BLOOD PRESSURE: 91 MMHG

## 2020-08-12 VITALS — DIASTOLIC BLOOD PRESSURE: 79 MMHG | SYSTOLIC BLOOD PRESSURE: 123 MMHG

## 2020-08-12 VITALS — DIASTOLIC BLOOD PRESSURE: 73 MMHG | SYSTOLIC BLOOD PRESSURE: 111 MMHG

## 2020-08-12 VITALS — DIASTOLIC BLOOD PRESSURE: 65 MMHG | SYSTOLIC BLOOD PRESSURE: 100 MMHG

## 2020-08-12 VITALS — DIASTOLIC BLOOD PRESSURE: 77 MMHG | SYSTOLIC BLOOD PRESSURE: 131 MMHG

## 2020-08-12 RX ADMIN — HYDROCODONE BITARTRATE AND ACETAMINOPHEN PRN TAB: 5; 325 TABLET ORAL at 06:47

## 2020-08-12 RX ADMIN — HYDROCODONE BITARTRATE AND ACETAMINOPHEN PRN TAB: 5; 325 TABLET ORAL at 12:15

## 2020-08-12 RX ADMIN — SUCRALFATE SCH GM: 1 SUSPENSION ORAL at 12:09

## 2020-08-12 RX ADMIN — ONDANSETRON HYDROCHLORIDE PRN MG: 2 INJECTION, SOLUTION INTRAMUSCULAR; INTRAVENOUS at 06:43

## 2020-08-12 RX ADMIN — SUCRALFATE SCH GM: 1 SUSPENSION ORAL at 16:55

## 2020-08-12 RX ADMIN — INSULIN GLARGINE SCH UNITS: 100 INJECTION, SOLUTION SUBCUTANEOUS at 10:26

## 2020-08-12 RX ADMIN — POTASSIUM CHLORIDE, DEXTROSE MONOHYDRATE AND SODIUM CHLORIDE SCH MLS/HR: 150; 5; 900 INJECTION, SOLUTION INTRAVENOUS at 12:09

## 2020-08-12 RX ADMIN — SODIUM CHLORIDE SCH MLS/HR: 0.9 INJECTION, SOLUTION INTRAVENOUS at 06:31

## 2020-08-12 RX ADMIN — HUMAN INSULIN SCH UNITS: 100 INJECTION, SOLUTION SUBCUTANEOUS at 17:04

## 2020-08-12 RX ADMIN — Medication SCH STRIP: at 16:56

## 2020-08-12 RX ADMIN — ONDANSETRON HYDROCHLORIDE PRN MG: 2 INJECTION, SOLUTION INTRAMUSCULAR; INTRAVENOUS at 12:09

## 2020-08-12 RX ADMIN — Medication SCH STRIP: at 06:31

## 2020-08-12 RX ADMIN — HUMAN INSULIN SCH UNITS: 100 INJECTION, SOLUTION SUBCUTANEOUS at 12:24

## 2020-08-12 RX ADMIN — Medication SCH STRIP: at 23:56

## 2020-08-12 RX ADMIN — INSULIN GLARGINE SCH UNITS: 100 INJECTION, SOLUTION SUBCUTANEOUS at 22:42

## 2020-08-12 RX ADMIN — ONDANSETRON HYDROCHLORIDE PRN MG: 2 INJECTION, SOLUTION INTRAMUSCULAR; INTRAVENOUS at 16:55

## 2020-08-12 RX ADMIN — HUMAN INSULIN SCH UNITS: 100 INJECTION, SOLUTION SUBCUTANEOUS at 06:39

## 2020-08-12 RX ADMIN — SUCRALFATE SCH GM: 1 SUSPENSION ORAL at 06:31

## 2020-08-12 RX ADMIN — HYDROCODONE BITARTRATE AND ACETAMINOPHEN PRN TAB: 5; 325 TABLET ORAL at 16:55

## 2020-08-12 RX ADMIN — PANTOPRAZOLE SODIUM SCH MG: 40 TABLET, DELAYED RELEASE ORAL at 22:35

## 2020-08-12 RX ADMIN — PANTOPRAZOLE SODIUM SCH MG: 40 TABLET, DELAYED RELEASE ORAL at 10:22

## 2020-08-12 RX ADMIN — SUCRALFATE SCH GM: 1 SUSPENSION ORAL at 22:35

## 2020-08-12 RX ADMIN — Medication SCH STRIP: at 12:20

## 2020-08-12 NOTE — NUR
Opening Shift Note

Assumed care of patient. Patient is awake, alert, and oriented X 4. Patient  No S/S of 
respiratory distress, pain, nausea noted or reported. On RA. Respirations are regular and 
non-labored.  Bed in lowest position, brakes locked, side rails X 2 up, call light within 
reach. Instructed on POC and to call for assistance PRN. Will continue to monitor for 
changes Q1hr and PRN.

## 2020-08-12 NOTE — NUR
IV DC'd/insertion

IV L. forearm DC'd due to malfunctioning and burning sensation reported by patient. Sterile 
technique used; catheter fully intact. Pressure dressing applied to the site and CO band 
wrapped. Patient tolerated procedure well.  New IV access obtained via sterile technique by 
inserting 22 gauge catheter at R. forearm. IV secured properly. No trauma to the site. 
Patient tolerated procedure well.

## 2020-08-12 NOTE — NUR
Assessment 



Patient is a 50-year old female who is alert and oriented. Prior to admission patient lived 
home with family and functioned independently. Per patient she can care for her own ADLs. 
Per patient she does not have any medical equipment now or home oxygen. Due to patient 
hospitalization patient was offered different post discharge plan such as private care 
givers, skill nursing facility and home health. Per patient she will return home to her 
prior living arrangements post discharge and family will transport her home. Informed 
patient she has a right to participate in all discharge planning. Patient verbalized 
understanding. 

-------------------------------------------------------------------------------

Addendum: 08/12/20 at 1616 by IGNACIO HICKS

-------------------------------------------------------------------------------

Amended: Links added.

## 2020-08-12 NOTE — NUR
RT NOTE

PT WAS SEEN BY RT FOR PRN HHN TX. PT STATES NO SOB AT THIS TIME. PT STATES SHE AWARE TO CALL 
IF TX NEEDED. POX 97% ON ROOM AIR. CONT PRN AS NEEDED

-------------------------------------------------------------------------------

Addendum: 08/12/20 at 1934 by Dionna Lancaster RT

-------------------------------------------------------------------------------

Amended: Links added.

## 2020-08-12 NOTE — NUR
RT NOTE: PRN BREATHING TX. NOT INDICATED AT THIS TIME. PT. DENIES ANY SOB. BREATH SOUNDS ARE 
CLEAR. PT. HR 76, RR 16, POX 94% R/A. PT. AWARE TO NOTIFY RN IF BREATHING TX. IS NEEDED.

## 2020-08-12 NOTE — NUR
IV insertion

IV access obtained via sterile technique by inserting 24 gauge catheter at Left Forearm. IV 
secured properly. No trauma to site. Patient tolerated procedure well.

## 2020-08-13 VITALS — DIASTOLIC BLOOD PRESSURE: 87 MMHG | SYSTOLIC BLOOD PRESSURE: 118 MMHG

## 2020-08-13 VITALS — SYSTOLIC BLOOD PRESSURE: 117 MMHG | DIASTOLIC BLOOD PRESSURE: 95 MMHG

## 2020-08-13 VITALS — DIASTOLIC BLOOD PRESSURE: 83 MMHG | SYSTOLIC BLOOD PRESSURE: 126 MMHG

## 2020-08-13 VITALS — DIASTOLIC BLOOD PRESSURE: 84 MMHG | SYSTOLIC BLOOD PRESSURE: 141 MMHG

## 2020-08-13 VITALS — SYSTOLIC BLOOD PRESSURE: 142 MMHG | DIASTOLIC BLOOD PRESSURE: 89 MMHG

## 2020-08-13 VITALS — SYSTOLIC BLOOD PRESSURE: 112 MMHG | DIASTOLIC BLOOD PRESSURE: 81 MMHG

## 2020-08-13 VITALS — DIASTOLIC BLOOD PRESSURE: 86 MMHG | SYSTOLIC BLOOD PRESSURE: 117 MMHG

## 2020-08-13 LAB
ALBUMIN SERPL-MCNC: 3.3 G/DL (ref 3.4–5)
ALP SERPL-CCNC: 56 U/L (ref 45–117)
ALT SERPL-CCNC: 165 U/L (ref 13–56)
ANION GAP SERPL CALCULATED.3IONS-SCNC: 4 MMOL/L (ref 5–15)
BILIRUB SERPL-MCNC: 0.8 MG/DL (ref 0.2–1)
BUN SERPL-MCNC: 9 MG/DL (ref 7–18)
BUN/CREAT SERPL: 11.4
CALCIUM SERPL-MCNC: 8.8 MG/DL (ref 8.5–10.1)
CHLORIDE SERPL-SCNC: 107 MMOL/L (ref 98–107)
CO2 SERPL-SCNC: 31 MMOL/L (ref 21–32)
GLUCOSE SERPL-MCNC: 93 MG/DL (ref 74–106)
HCT VFR BLD AUTO: 42.1 % (ref 36–46)
HGB BLD-MCNC: 14.1 G/DL (ref 12.2–16.2)
MCH RBC QN AUTO: 29.5 PG (ref 28–32)
MCV RBC AUTO: 88 FL (ref 80–100)
NRBC BLD QL AUTO: 0.1 %
POTASSIUM SERPL-SCNC: 3.8 MMOL/L (ref 3.5–5.1)
PROT SERPL-MCNC: 6.1 G/DL (ref 6.4–8.2)
SODIUM SERPL-SCNC: 142 MMOL/L (ref 136–145)

## 2020-08-13 PROCEDURE — 0DB68ZX EXCISION OF STOMACH, VIA NATURAL OR ARTIFICIAL OPENING ENDOSCOPIC, DIAGNOSTIC: ICD-10-PCS | Performed by: INTERNAL MEDICINE

## 2020-08-13 PROCEDURE — 0DJD8ZZ INSPECTION OF LOWER INTESTINAL TRACT, VIA NATURAL OR ARTIFICIAL OPENING ENDOSCOPIC: ICD-10-PCS | Performed by: INTERNAL MEDICINE

## 2020-08-13 RX ADMIN — MIDAZOLAM HYDROCHLORIDE ONE MG: 5 INJECTION INTRAMUSCULAR; INTRAVENOUS at 09:52

## 2020-08-13 RX ADMIN — FENTANYL CITRATE ONE MCG: 50 INJECTION, SOLUTION INTRAMUSCULAR; INTRAVENOUS at 10:04

## 2020-08-13 RX ADMIN — PANTOPRAZOLE SODIUM SCH MG: 40 TABLET, DELAYED RELEASE ORAL at 10:53

## 2020-08-13 RX ADMIN — MIDAZOLAM HYDROCHLORIDE ONE MG: 5 INJECTION INTRAMUSCULAR; INTRAVENOUS at 10:04

## 2020-08-13 RX ADMIN — Medication SCH STRIP: at 11:21

## 2020-08-13 RX ADMIN — INSULIN GLARGINE SCH UNITS: 100 INJECTION, SOLUTION SUBCUTANEOUS at 11:14

## 2020-08-13 RX ADMIN — HUMAN INSULIN SCH UNITS: 100 INJECTION, SOLUTION SUBCUTANEOUS at 05:47

## 2020-08-13 RX ADMIN — FENTANYL CITRATE ONE MCG: 50 INJECTION, SOLUTION INTRAMUSCULAR; INTRAVENOUS at 09:57

## 2020-08-13 RX ADMIN — HUMAN INSULIN SCH UNITS: 100 INJECTION, SOLUTION SUBCUTANEOUS at 11:15

## 2020-08-13 RX ADMIN — SUCRALFATE SCH GM: 1 SUSPENSION ORAL at 10:53

## 2020-08-13 RX ADMIN — Medication SCH STRIP: at 05:47

## 2020-08-13 RX ADMIN — HUMAN INSULIN SCH UNITS: 100 INJECTION, SOLUTION SUBCUTANEOUS at 00:00

## 2020-08-13 RX ADMIN — POTASSIUM CHLORIDE, DEXTROSE MONOHYDRATE AND SODIUM CHLORIDE SCH MLS/HR: 150; 5; 900 INJECTION, SOLUTION INTRAVENOUS at 06:13

## 2020-08-13 RX ADMIN — MIDAZOLAM HYDROCHLORIDE ONE MG: 5 INJECTION INTRAMUSCULAR; INTRAVENOUS at 09:57

## 2020-08-13 RX ADMIN — SUCRALFATE SCH GM: 1 SUSPENSION ORAL at 06:31

## 2020-08-13 RX ADMIN — FENTANYL CITRATE ONE MCG: 50 INJECTION, SOLUTION INTRAMUSCULAR; INTRAVENOUS at 09:52

## 2020-08-13 NOTE — NUR
Patient came back from OR s/p EGD and colonoscopy. PT is drowsy and verbally responsive. 
skin is warm and dry to touch. No c/o of pain at this time. No respiratory distress noted. 
, no s/s of hyperglycemia or hypoglycemia noted. dr. Pinzon at bedside discussed with 
patient about DC planning, pt agreed to DC home in the afternoon.

## 2020-08-13 NOTE — NUR
Discharge instructions given as ordered. Encourage to follow up with PMD in [place]  as 
instructed. All questions and concerns addressed. Patient verbalized understanding.  
Medication reconciliation form completed and copy given to patient.  IV removed with 
catheter intact, pressure dressing applied.  Patient taken to vehicle via wheelchair with 
all personal belongings, accompanied by staff and family member. No distress noted at time 
of departure.

## 2020-08-13 NOTE — NUR
PT. ASSESSED FOR PRN. MN. TX., NO RESP. DISTRESS OR SOB NOTED. PT. STATES SHE HAS BEEN GOING 
TO THE BATHROOM ALL MORNING.  PT. STATES ALSO SHE IS GOING TO HAVE COLONOSCOPY TODAY. BS. 
ARE CLEAR, PT. IS ON RA SP02 96%,HR 82,RR 18. PRN. TX. NOT INDICATED AT THIS TIME.  PT. IS 
AWARE SHE MAY CALL IF NEEDED.  NO TX. GIVEN AT THIS TIME. 0